# Patient Record
Sex: FEMALE | Race: WHITE | URBAN - METROPOLITAN AREA
[De-identification: names, ages, dates, MRNs, and addresses within clinical notes are randomized per-mention and may not be internally consistent; named-entity substitution may affect disease eponyms.]

---

## 2020-01-23 ENCOUNTER — TRANSFERRED RECORDS (OUTPATIENT)
Dept: HEALTH INFORMATION MANAGEMENT | Facility: CLINIC | Age: 80
End: 2020-01-23

## 2020-01-23 NOTE — TELEPHONE ENCOUNTER
FUTURE VISIT INFORMATION      FUTURE VISIT INFORMATION:    Date: 1/28/20    Time: 8:30am    Location: [facility]  REFERRAL INFORMATION:    Referring provider:  CATARINA QUINONES MD    Referring providers clinic:  [facility]    Reason for visit/diagnosis  NLDO w/ possible bacterial infection, scope/irrigation/HW    RECORDS REQUESTED FROM:       Clinic name Comments Records Status Imaging Status   [facility] Request for recs sent 1/23- received and sent to scanning EPIC

## 2020-01-28 ENCOUNTER — APPOINTMENT (OUTPATIENT)
Dept: LAB | Facility: CLINIC | Age: 80
End: 2020-01-28
Payer: MEDICARE

## 2020-01-28 ENCOUNTER — PRE VISIT (OUTPATIENT)
Dept: OPHTHALMOLOGY | Facility: CLINIC | Age: 80
End: 2020-01-28

## 2020-01-28 ENCOUNTER — TELEPHONE (OUTPATIENT)
Dept: OPHTHALMOLOGY | Facility: CLINIC | Age: 80
End: 2020-01-28

## 2020-01-28 ENCOUNTER — ANCILLARY PROCEDURE (OUTPATIENT)
Dept: CT IMAGING | Facility: CLINIC | Age: 80
End: 2020-01-28
Attending: OPHTHALMOLOGY
Payer: MEDICARE

## 2020-01-28 ENCOUNTER — OFFICE VISIT (OUTPATIENT)
Dept: OPHTHALMOLOGY | Facility: CLINIC | Age: 80
End: 2020-01-28
Payer: MEDICARE

## 2020-01-28 DIAGNOSIS — H04.322 ACUTE DACRYOCYSTITIS OF LEFT LACRIMAL SAC: ICD-10-CM

## 2020-01-28 DIAGNOSIS — C91.10 CLL (CHRONIC LYMPHOCYTIC LEUKEMIA) (H): ICD-10-CM

## 2020-01-28 DIAGNOSIS — H04.552 ACQUIRED OBSTRUCTION OF LEFT NASOLACRIMAL DUCT: ICD-10-CM

## 2020-01-28 DIAGNOSIS — C91.10 CLL (CHRONIC LYMPHOCYTIC LEUKEMIA) (H): Primary | ICD-10-CM

## 2020-01-28 PROBLEM — E26.09 PRIMARY ALDOSTERONISM (H): Status: ACTIVE | Noted: 2017-08-04

## 2020-01-28 PROBLEM — C44.90 SKIN CANCER: Status: ACTIVE | Noted: 2020-01-28

## 2020-01-28 PROBLEM — I10 HYPERTENSION: Status: ACTIVE | Noted: 2020-01-28

## 2020-01-28 PROBLEM — D63.8 ANEMIA OF CHRONIC DISEASE: Status: ACTIVE | Noted: 2018-11-19

## 2020-01-28 PROBLEM — H35.373 EPIRETINAL MEMBRANE (ERM) OF BOTH EYES: Status: ACTIVE | Noted: 2020-01-28

## 2020-01-28 PROBLEM — M85.80 OSTEOPENIA: Status: ACTIVE | Noted: 2019-04-01

## 2020-01-28 PROBLEM — S82.63XA CLOSED FRACTURE OF LATERAL MALLEOLUS: Status: ACTIVE | Noted: 2019-06-18

## 2020-01-28 LAB
CREAT SERPL-MCNC: 0.95 MG/DL (ref 0.52–1.04)
GFR SERPL CREATININE-BSD FRML MDRD: 57 ML/MIN/{1.73_M2}
RADIOLOGIST FLAGS: ABNORMAL

## 2020-01-28 RX ORDER — SULFAMETHOXAZOLE/TRIMETHOPRIM 800-160 MG
TABLET ORAL
COMMUNITY
Start: 2019-11-19

## 2020-01-28 RX ORDER — IOPAMIDOL 755 MG/ML
75 INJECTION, SOLUTION INTRAVASCULAR ONCE
Status: COMPLETED | OUTPATIENT
Start: 2020-01-28 | End: 2020-01-28

## 2020-01-28 RX ORDER — AMLODIPINE BESYLATE 5 MG/1
TABLET ORAL
COMMUNITY
Start: 2019-07-12

## 2020-01-28 RX ORDER — GINSENG 100 MG
CAPSULE ORAL
COMMUNITY
Start: 2019-01-01

## 2020-01-28 RX ORDER — ACETAMINOPHEN 500 MG
1000 TABLET ORAL
COMMUNITY

## 2020-01-28 RX ORDER — LEVOTHYROXINE SODIUM 50 UG/1
50 TABLET ORAL
COMMUNITY
Start: 2018-11-19

## 2020-01-28 RX ORDER — SIMVASTATIN 20 MG
TABLET ORAL
COMMUNITY
Start: 2019-10-05

## 2020-01-28 RX ORDER — SPIRONOLACTONE 50 MG/1
TABLET, FILM COATED ORAL
COMMUNITY
Start: 2019-10-05

## 2020-01-28 RX ORDER — ALLOPURINOL 300 MG/1
TABLET ORAL
COMMUNITY
Start: 2019-09-26

## 2020-01-28 RX ADMIN — IOPAMIDOL 75 ML: 755 INJECTION, SOLUTION INTRAVASCULAR at 10:40

## 2020-01-28 ASSESSMENT — CONF VISUAL FIELD
OS_NORMAL: 1
METHOD: COUNTING FINGERS
OD_NORMAL: 1

## 2020-01-28 ASSESSMENT — VISUAL ACUITY
OD_SC: 20/60
OS_SC+: +2
METHOD: SNELLEN - LINEAR
OS_SC: 20/30

## 2020-01-28 ASSESSMENT — TONOMETRY
OS_IOP_MMHG: 11
IOP_METHOD: ICARE
OD_IOP_MMHG: 10

## 2020-01-28 ASSESSMENT — SLIT LAMP EXAM - LIDS: COMMENTS: NORMAL

## 2020-01-28 NOTE — LETTER
" 2020         RE:  :  MRN: Susana Bennett  1940  [id number]     Dear Dr. Ortiz,    Thank you for asking me to see your patient, Susana Bennett, for an oculoplastic   consultation.  My assessment and plan are below.  For further details, please see my attached clinic note.      Chief Complaint(s) and History of Present Illness(es)     Nasolacrimal Duct Obstruction Evaluation     Laterality: left eye    Characteristics: sudden onset    Associated symptoms: bump on lid    Frequency: constantly    Duration: months    Course: gradually worsening    Treatments tried: warm compresses    Response to treatment: moderate improvement    Pain scale: 0/10              Comments     Susana Bennett is being seen today at the request of Dr. Herman Ortiz for obstruction of nasolacrimal duct left eye.  Pt states has had   recurrent left tear duct infections, started in 2019 and had it   again 5 days ago.  Amoxicillin medication and warm compresses. It burst on   Saturday night. It bled this time, but in the past has drained pus.     Amanda Figueroa, COT COT 8:19 AM 2020     First significant dacryocystis was 2019, but feels had recurrent medial canthal swelling over the past year.     Has history of CLL in \"remision.\" Diagnosed 5 years ago. Treated with chemo.   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     Being observed for Epiretinal membrane on the right eye.          Assessment & Plan     Susana Bennett is a 79 year old female with the following diagnoses:   1. Acute dacryocystitis of left lacrimal sac    2. CLL (chronic lymphocytic leukemia) (H)    3. Acquired obstruction of left nasolacrimal duct         Recurrent dacryocystitis left eye.  Given history of CLL and palpable mass (which could just be dacryocystitis), recommend imaging with CT. Based on imaging results will decide if just lacrimal sac biopsy then later date " Dacryocystorhinostomy (DCR), or left dacryocytorhinostomy and biopsy of lacrimal sac.           Again, thank you for allowing me to participate in the care of your patient.      Sincerely,    Ruperto Cardenas MD  Department of Ophthalmology and Visual Neurosciences  [facility]    CC: Blu Ramírez MD  [facility]  [address]  [place] [address]  VIA Facsimile: [phone]     Richardson Ortiz MD  [facility]           [address] [address]  VIA Facsimile: [phone]

## 2020-01-28 NOTE — NURSING NOTE
Chief Complaints and History of Present Illnesses   Patient presents with     Nasolacrimal Duct Obstruction Evaluation     Chief Complaint(s) and History of Present Illness(es)     Nasolacrimal Duct Obstruction Evaluation     Laterality: left eye    Characteristics: sudden onset    Associated symptoms: bump on lid    Frequency: constantly    Duration: months    Course: gradually worsening    Treatments tried: warm compresses    Response to treatment: moderate improvement    Pain scale: 0/10              Comments     Susana Bennett is being seen today at the request of Andrew Ortiz for obstruction of nasolacrimal duct left eye.  Pt states has had recurrent left tear duct infections, started in November 2019 and had it again 5 days ago.  Amoxicillin medication and warm compresses. It burst on Saturday night.     LENNY Dugan COT 8:19 AM January 28, 2020

## 2020-01-28 NOTE — PROGRESS NOTES
"     Chief Complaint(s) and History of Present Illness(es)     Nasolacrimal Duct Obstruction Evaluation     Laterality: left eye    Characteristics: sudden onset    Associated symptoms: bump on lid    Frequency: constantly    Duration: months    Course: gradually worsening    Treatments tried: warm compresses    Response to treatment: moderate improvement    Pain scale: 0/10              Comments     Susana Bennett is being seen today at the request of Dr. Herman Ortiz for obstruction of nasolacrimal duct left eye.  Pt states has had   recurrent left tear duct infections, started in November 2019 and had it   again 5 days ago.  Amoxicillin medication and warm compresses. It burst on   Saturday night. It bled this time, but in the past has drained pus.     Amanda Figueroa, COT COT 8:19 AM January 28, 2020     First significant dacryocystis was nov 2019, but feels had recurrent medial canthal swelling over the past year.     Has history of CLL in \"remision.\" Diagnosed 5 years ago. Treated with chemo.   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     Being observed for Epiretinal membrane on the right eye.          Assessment & Plan     Susana Bennett is a 79 year old female with the following diagnoses:   1. Acute dacryocystitis of left lacrimal sac    2. CLL (chronic lymphocytic leukemia) (H)    3. Acquired obstruction of left nasolacrimal duct         Recurrent dacryocystitis left eye.  Given history of CLL and palpable mass (which could just be dacryocystitis), recommend imaging with CT. Based on imaging results will decide if just lacrimal sac biopsy then later date Dacryocystorhinostomy (DCR), or left dacryocytorhinostomy and biopsy of lacrimal sac.     Addendum: Reviewed imaging, rim enhancing lesion in lacrimal sac most consistent with dacryocystitis. Plan: Dacryocystorhinostomy (DCR), biopsy lacrimal sac, silicone stent. Called and discussed with " patient.          Attending Physician Attestation:  Complete documentation of historical and exam elements from today's encounter can be found in the full encounter summary report (not reduplicated in this progress note).  I personally obtained the chief complaint(s) and history of present illness.  I confirmed and edited as necessary the review of systems, past medical/surgical history, family history, social history, and examination findings as documented by others; and I examined the patient myself.  I personally reviewed the relevant tests, images, and reports as documented above.  I formulated and edited as necessary the assessment and plan and discussed the findings and management plan with the patient and family. - Ruperto Cardenas MD    DIAGNOSIS: Epiphora left eye  PROCEDURE: Nasal Endoscopy  SURGEON: Ruperto Cardenas MD  ANESTHESIA: Topical  COMPLICATIONS: None  EBL: Nil  FINDINGS: Normal nasal exam for  lacrimal surgery, no evidence of intranasal mass     Indications: Examine the nasal cavity for lacrimal surgery    The zero degree nasal endoscope was passed under endoscopic vision. The inferior turbinates appeared normal. There was  normal middle turbinate architecture. No polyps or purulence. No septal perforation. No masses or lesions were identified.     Ruperto Cardenas MD  January 28, 2020  8:45 AM  I was present for the entire procedure - Ruperto Cardenas MD

## 2020-01-28 NOTE — TELEPHONE ENCOUNTER
Met with patient to schedule surgery with Dr. Ruperto Cardenas.    Surgery was scheduled on 02/13 at [facility]  Patient will have H&P at [facility]  Post-Op care appointment was scheduled on 02/25  Patient is aware a / is needed day of surgery.   Patient received surgery packet has my direct contact information for any further questions.

## 2020-01-30 ENCOUNTER — TRANSFERRED RECORDS (OUTPATIENT)
Dept: HEALTH INFORMATION MANAGEMENT | Facility: CLINIC | Age: 80
End: 2020-01-30

## 2020-02-12 ENCOUNTER — ANESTHESIA EVENT (OUTPATIENT)
Dept: SURGERY | Facility: AMBULATORY SURGERY CENTER | Age: 80
End: 2020-02-12

## 2020-02-13 ENCOUNTER — ANESTHESIA (OUTPATIENT)
Dept: SURGERY | Facility: AMBULATORY SURGERY CENTER | Age: 80
End: 2020-02-13

## 2020-02-13 ENCOUNTER — HOSPITAL ENCOUNTER (OUTPATIENT)
Facility: AMBULATORY SURGERY CENTER | Age: 80
End: 2020-02-13
Attending: OPHTHALMOLOGY
Payer: MEDICARE

## 2020-02-13 VITALS
HEIGHT: 59 IN | SYSTOLIC BLOOD PRESSURE: 135 MMHG | HEART RATE: 96 BPM | RESPIRATION RATE: 16 BRPM | DIASTOLIC BLOOD PRESSURE: 75 MMHG | BODY MASS INDEX: 29.03 KG/M2 | OXYGEN SATURATION: 95 % | WEIGHT: 144 LBS | TEMPERATURE: 98.9 F

## 2020-02-13 DIAGNOSIS — C91.10 CLL (CHRONIC LYMPHOCYTIC LEUKEMIA) (H): ICD-10-CM

## 2020-02-13 DIAGNOSIS — H04.322 ACUTE DACRYOCYSTITIS OF LEFT LACRIMAL SAC: ICD-10-CM

## 2020-02-13 LAB — GLUCOSE BLDC GLUCOMTR-MCNC: 134 MG/DL (ref 70–99)

## 2020-02-13 PROCEDURE — 88185 FLOWCYTOMETRY/TC ADD-ON: CPT | Performed by: OPHTHALMOLOGY

## 2020-02-13 PROCEDURE — 00000160 ZZHCL STATISTIC H-SPECIAL HANDLING: Performed by: OPHTHALMOLOGY

## 2020-02-13 PROCEDURE — 88184 FLOWCYTOMETRY/ TC 1 MARKER: CPT | Performed by: OPHTHALMOLOGY

## 2020-02-13 PROCEDURE — 40001004 ZZHCL STATISTIC FLOW INT 9-15 ABY TC 88188: Performed by: OPHTHALMOLOGY

## 2020-02-13 PROCEDURE — 88341 IMHCHEM/IMCYTCHM EA ADD ANTB: CPT | Performed by: OPHTHALMOLOGY

## 2020-02-13 PROCEDURE — 88305 TISSUE EXAM BY PATHOLOGIST: CPT | Performed by: OPHTHALMOLOGY

## 2020-02-13 PROCEDURE — 00000159 ZZHCL STATISTIC H-SEND OUTS PREP: Performed by: OPHTHALMOLOGY

## 2020-02-13 PROCEDURE — 88342 IMHCHEM/IMCYTCHM 1ST ANTB: CPT | Mod: XU | Performed by: OPHTHALMOLOGY

## 2020-02-13 DEVICE — EYE STENT LACRIMAL LRG LIS052: Type: IMPLANTABLE DEVICE | Site: EYE | Status: FUNCTIONAL

## 2020-02-13 RX ORDER — ERYTHROMYCIN 5 MG/G
0.5 OINTMENT OPHTHALMIC 3 TIMES DAILY
Qty: 3.5 G | Refills: 0 | Status: SHIPPED | OUTPATIENT
Start: 2020-02-13 | End: 2020-02-25

## 2020-02-13 RX ORDER — OXYCODONE HYDROCHLORIDE 5 MG/1
5 TABLET ORAL EVERY 6 HOURS PRN
Qty: 10 TABLET | Refills: 0 | Status: SHIPPED | OUTPATIENT
Start: 2020-02-13 | End: 2020-02-25

## 2020-02-13 RX ORDER — NALOXONE HYDROCHLORIDE 0.4 MG/ML
.1-.4 INJECTION, SOLUTION INTRAMUSCULAR; INTRAVENOUS; SUBCUTANEOUS
Status: DISCONTINUED | OUTPATIENT
Start: 2020-02-13 | End: 2020-02-14 | Stop reason: HOSPADM

## 2020-02-13 RX ORDER — OXYCODONE HYDROCHLORIDE 5 MG/1
5 TABLET ORAL EVERY 4 HOURS PRN
Status: DISCONTINUED | OUTPATIENT
Start: 2020-02-13 | End: 2020-02-14 | Stop reason: HOSPADM

## 2020-02-13 RX ORDER — ONDANSETRON 4 MG/1
4 TABLET, ORALLY DISINTEGRATING ORAL EVERY 30 MIN PRN
Status: DISCONTINUED | OUTPATIENT
Start: 2020-02-13 | End: 2020-02-14 | Stop reason: HOSPADM

## 2020-02-13 RX ORDER — ONDANSETRON 2 MG/ML
INJECTION INTRAMUSCULAR; INTRAVENOUS PRN
Status: DISCONTINUED | OUTPATIENT
Start: 2020-02-13 | End: 2020-02-13

## 2020-02-13 RX ORDER — LIDOCAINE 40 MG/G
CREAM TOPICAL
Status: DISCONTINUED | OUTPATIENT
Start: 2020-02-13 | End: 2020-02-13 | Stop reason: HOSPADM

## 2020-02-13 RX ORDER — OXYMETAZOLINE HYDROCHLORIDE 0.05 G/100ML
SPRAY NASAL PRN
Status: DISCONTINUED | OUTPATIENT
Start: 2020-02-13 | End: 2020-02-13 | Stop reason: HOSPADM

## 2020-02-13 RX ORDER — FENTANYL CITRATE 50 UG/ML
INJECTION, SOLUTION INTRAMUSCULAR; INTRAVENOUS PRN
Status: DISCONTINUED | OUTPATIENT
Start: 2020-02-13 | End: 2020-02-13

## 2020-02-13 RX ORDER — FENTANYL CITRATE 50 UG/ML
25-50 INJECTION, SOLUTION INTRAMUSCULAR; INTRAVENOUS
Status: DISCONTINUED | OUTPATIENT
Start: 2020-02-13 | End: 2020-02-13 | Stop reason: HOSPADM

## 2020-02-13 RX ORDER — PROPOFOL 10 MG/ML
INJECTION, EMULSION INTRAVENOUS PRN
Status: DISCONTINUED | OUTPATIENT
Start: 2020-02-13 | End: 2020-02-13

## 2020-02-13 RX ORDER — DEXAMETHASONE SODIUM PHOSPHATE 4 MG/ML
INJECTION, SOLUTION INTRA-ARTICULAR; INTRALESIONAL; INTRAMUSCULAR; INTRAVENOUS; SOFT TISSUE PRN
Status: DISCONTINUED | OUTPATIENT
Start: 2020-02-13 | End: 2020-02-13

## 2020-02-13 RX ORDER — ONDANSETRON 2 MG/ML
4 INJECTION INTRAMUSCULAR; INTRAVENOUS EVERY 30 MIN PRN
Status: DISCONTINUED | OUTPATIENT
Start: 2020-02-13 | End: 2020-02-14 | Stop reason: HOSPADM

## 2020-02-13 RX ORDER — ACETAMINOPHEN 325 MG/1
975 TABLET ORAL ONCE
Status: COMPLETED | OUTPATIENT
Start: 2020-02-13 | End: 2020-02-13

## 2020-02-13 RX ORDER — ERYTHROMYCIN 5 MG/G
OINTMENT OPHTHALMIC PRN
Status: DISCONTINUED | OUTPATIENT
Start: 2020-02-13 | End: 2020-02-13 | Stop reason: HOSPADM

## 2020-02-13 RX ORDER — SODIUM CHLORIDE, SODIUM LACTATE, POTASSIUM CHLORIDE, CALCIUM CHLORIDE 600; 310; 30; 20 MG/100ML; MG/100ML; MG/100ML; MG/100ML
INJECTION, SOLUTION INTRAVENOUS CONTINUOUS
Status: DISCONTINUED | OUTPATIENT
Start: 2020-02-13 | End: 2020-02-13 | Stop reason: HOSPADM

## 2020-02-13 RX ORDER — LIDOCAINE HYDROCHLORIDE 20 MG/ML
INJECTION, SOLUTION INFILTRATION; PERINEURAL PRN
Status: DISCONTINUED | OUTPATIENT
Start: 2020-02-13 | End: 2020-02-13

## 2020-02-13 RX ORDER — SODIUM CHLORIDE, SODIUM LACTATE, POTASSIUM CHLORIDE, CALCIUM CHLORIDE 600; 310; 30; 20 MG/100ML; MG/100ML; MG/100ML; MG/100ML
INJECTION, SOLUTION INTRAVENOUS CONTINUOUS PRN
Status: DISCONTINUED | OUTPATIENT
Start: 2020-02-13 | End: 2020-02-13

## 2020-02-13 RX ORDER — SODIUM CHLORIDE, SODIUM LACTATE, POTASSIUM CHLORIDE, CALCIUM CHLORIDE 600; 310; 30; 20 MG/100ML; MG/100ML; MG/100ML; MG/100ML
INJECTION, SOLUTION INTRAVENOUS CONTINUOUS
Status: DISCONTINUED | OUTPATIENT
Start: 2020-02-13 | End: 2020-02-14 | Stop reason: HOSPADM

## 2020-02-13 RX ORDER — PROPOFOL 10 MG/ML
INJECTION, EMULSION INTRAVENOUS CONTINUOUS PRN
Status: DISCONTINUED | OUTPATIENT
Start: 2020-02-13 | End: 2020-02-13

## 2020-02-13 RX ORDER — MEPERIDINE HYDROCHLORIDE 25 MG/ML
12.5 INJECTION INTRAMUSCULAR; INTRAVENOUS; SUBCUTANEOUS
Status: DISCONTINUED | OUTPATIENT
Start: 2020-02-13 | End: 2020-02-14 | Stop reason: HOSPADM

## 2020-02-13 RX ADMIN — PROPOFOL 150 MG: 10 INJECTION, EMULSION INTRAVENOUS at 11:28

## 2020-02-13 RX ADMIN — PROPOFOL 150 MCG/KG/MIN: 10 INJECTION, EMULSION INTRAVENOUS at 11:28

## 2020-02-13 RX ADMIN — SODIUM CHLORIDE, SODIUM LACTATE, POTASSIUM CHLORIDE, CALCIUM CHLORIDE: 600; 310; 30; 20 INJECTION, SOLUTION INTRAVENOUS at 11:16

## 2020-02-13 RX ADMIN — Medication 200 MCG: at 11:48

## 2020-02-13 RX ADMIN — LIDOCAINE HYDROCHLORIDE 50 MG: 20 INJECTION, SOLUTION INFILTRATION; PERINEURAL at 11:27

## 2020-02-13 RX ADMIN — OXYCODONE HYDROCHLORIDE 5 MG: 5 TABLET ORAL at 12:42

## 2020-02-13 RX ADMIN — Medication 200 MCG: at 11:30

## 2020-02-13 RX ADMIN — DEXAMETHASONE SODIUM PHOSPHATE 4 MG: 4 INJECTION, SOLUTION INTRA-ARTICULAR; INTRALESIONAL; INTRAMUSCULAR; INTRAVENOUS; SOFT TISSUE at 11:33

## 2020-02-13 RX ADMIN — ACETAMINOPHEN 975 MG: 325 TABLET ORAL at 09:04

## 2020-02-13 RX ADMIN — ONDANSETRON 4 MG: 2 INJECTION INTRAMUSCULAR; INTRAVENOUS at 11:33

## 2020-02-13 RX ADMIN — Medication 200 MCG: at 11:39

## 2020-02-13 RX ADMIN — FENTANYL CITRATE 25 MCG: 50 INJECTION, SOLUTION INTRAMUSCULAR; INTRAVENOUS at 11:27

## 2020-02-13 ASSESSMENT — MIFFLIN-ST. JEOR: SCORE: 1043.81

## 2020-02-13 NOTE — OP NOTE
PREOPERATIVE DIAGNOSIS: Left complete nasolacrimal duct obstruction with dacryocystitis.   POSTOPERATIVE DIAGNOSIS: Left complete nasolacrimal duct obstruction with dacryocystitis.   PROCEDURE: Left dacryocystorhinostomy with silicone intubation. Left lacrimal sac biopsy.    SURGEON: Ruperot Cardenas M.D.  ASSISTANTS: Tarik Nam MD  ANESTHESIA: General anesthesia with local infiltration of a 50/50 mixture of 2% lidocaine with epinephrine and 0.5% Marcaine.   COMPLICATIONS: None.   ESTIMATED BLOOD LOSS: Less than 5 mL.   SPECIMEN: * No specimens in log *  HISTORY: Susana Bennett  presented with complete nasolacrimal duct obstruction leading to tearing and chronic irritation. She had a palpable medial canthal mass. On imaging this appeared to be rim enhancing most likely consistent with dacryocystitis. Given her history of CLL, decision was made to biopsy the lacrimal sac to be certain it does not indicate involvement of the lacrimal sac. After the risks, benefits and alternatives to the proposed procedure were explained, informed consent was obtained.   DESCRIPTION OF PROCEDURE: Susana Bennett  was brought to the operating room and placed supine on the operating table. Under general anesthesia, a medial canthal incision was drawn 10 mm from the medial canthal angle and extending 8 mm in an inferotemporal direction.  The medial canthus and lateral nasal wall were infiltrated with local anesthetic mixture. Afrin soaked neuropaddies were placed into the nose in the region of the middle meatus.   The area  was prepped and draped in the typical fashion. Attention was directed to the left side. Medial canthal incision was made with a 15 blade and dissection was carried down through the orbicularis with monopolar cautery. [place] elevator was used to elevate the periorbita from the lacrimal fossa.  The neuropaddies were removed from the nose.  The thin posterior lacrimal bone was infractured with the [place] elevator. The  osteotomy was created using [place] and [place] rongeurs. Approximately a 12 mm osteotomy was created. The 0 Mg probe was placed in the lacrimal sac through the upper canaliculus. The lacrimal sac was opened vertically with the keratome. A posterior flap of lacrimal sac was removed and sent to pathology. The nasal mucosa was incised with a keratome in a U-shaped anteriorly based flap. The stent tube silicone intubation set was used to intubate the upper and lower canalicular system and retrieved in the nose with a Booth hook. A 5-0 vicryl was used to tie the stent together to prevent prolapse. The lacrimal sac and nasal mucosa were sutured together with interrupted 5-0 Vicryl sutures. Orbicularis layer was closed with running 5-0 Vicryl suture. Skin was closed with running 6-0 plain gut suture. The silicone tubes were tied in a square knot and were trimmed at the naris.  Erythromycin ophthalmic ointment was applied to the incision. The patient tolerated the procedure well and  left the operating room in stable condition.   Ruperto Cardenas MD

## 2020-02-13 NOTE — ANESTHESIA CARE TRANSFER NOTE
Patient: Susana Bennett    Procedure(s):  Left lacrimal sac biopsy and external dacryocystorhinostomy    Diagnosis: Acute dacryocystitis of left lacrimal sac [H04.322]  CLL (chronic lymphocytic leukemia) (H) [C91.10]  Diagnosis Additional Information: No value filed.    Anesthesia Type:   General     Note:  Airway :Room Air  Patient transferred to:PACU  Comments: VSS and WNL, comfortable, no PONV, report to Joseline VILLEDAHandoff Report: Identifed the Patient, Identified the Reponsible Provider, Reviewed the pertinent medical history, Discussed the surgical course, Reviewed Intra-OP anesthesia mangement and issues during anesthesia, Set expectations for post-procedure period and Allowed opportunity for questions and acknowledgement of understanding      Vitals: (Last set prior to Anesthesia Care Transfer)    [facility] VITALS  2/13/2020 1139 - 2/13/2020 1213      2/13/2020             Pulse:  77    SpO2:  96 %    Resp Rate (observed):  14                Electronically Signed By: MESERET Estrada CRNA  February 13, 2020  12:13 PM

## 2020-02-13 NOTE — ANESTHESIA POSTPROCEDURE EVALUATION
Anesthesia POST Procedure Evaluation    Patient: Susana Bennett   MRN:     [id number] Gender:   female   Age:    79 year old :      1940        Preoperative Diagnosis: Acute dacryocystitis of left lacrimal sac [H04.322]  CLL (chronic lymphocytic leukemia) (H) [C91.10]   Procedure(s):  Left lacrimal sac biopsy and external dacryocystorhinostomy   Postop Comments: No value filed.       Anesthesia Type:  Not documented  General    Reportable Event: NO     PAIN: Uncomplicated   Sign Out status: Comfortable, Well controlled pain     PONV: No PONV   Sign Out status:  No Nausea or Vomiting     Neuro/Psych: Uneventful perioperative course   Sign Out Status: Preoperative baseline; Age appropriate mentation     Airway/Resp.: Uneventful perioperative course   Sign Out Status: Non labored breathing, age appropriate RR; Resp. Status within EXPECTED Parameters     CV: Uneventful perioperative course   Sign Out status: Appropriate BP and perfusion indices; Appropriate HR/Rhythm     Disposition:   Sign Out in:  PACU  Disposition:  Phase II; Home  Recovery Course: Uneventful  Follow-Up: Not required           Last Anesthesia Record Vitals:  [facility] VITALS  2020 1139 - 2020 1239      2020             Pulse:  77    SpO2:  96 %    Resp Rate (observed):  14          Last PACU Vitals:  Vitals Value Taken Time   /94 2020 12:24 PM   Temp 37.2  C (98.9  F) 2020 12:24 PM   Pulse 94 2020 12:24 PM   Resp 12 2020 12:25 PM   SpO2 93 % 2020 12:25 PM   Temp src     NIBP     Pulse     SpO2     Resp     Temp     Ht Rate     Temp 2     Vitals shown include unvalidated device data.      Electronically Signed By: Scotty Sandoval MD, 2020, 2:14 PM

## 2020-02-13 NOTE — DISCHARGE INSTRUCTIONS
Post-operative Instructions    Ophthalmic Plastic and Reconstructive Surgery  MD Nancy Brower M.D.    All instructions apply to the operated eye(s) or eyelid(s).  Wound care and personal care  ? If a patch or bandage has been placed, please leave this in place until seen by your physician. Ensure that the bandage does not get wet when you take a shower.  ? Apply ice compresses 15 minutes on 15 minutes off while awake for 2 days, then switch to warm water compresses 4 times a day until seen by your physician. For warm packs you can place a cup of dry uncooked rice in a clean cotton sock. Then place sock in microwave 30 seconds to one minute. Next place the warm sock into a plastic bag and wrap the bag with clean warm wet washcloth and place over operated eye.    ? You may shower or wash your hair the day after surgery. Do not bathe or go swimming for 1 week to prevent contamination of your wounds.  ? Do not apply make-up to the eyes or eyelids for 2 weeks after surgery.  ? Expect some swelling, bruising, black eye (even into the lower eyelids and cheeks). Also expect serum caking, crusting and discharge from the eye and/or incisions. A small amount of surface bleeding is normal for the first 48 hours.  ? Your eye(s) and eyelid(s) may be painful and tender. This is normal after surgery.  Use the pain medication as prescribed. If your pain does not improve despite the  medication, contact the office.    Contact information and follow-up  ? Return to the Eye Clinic for a follow-up appointment with your physician as  scheduled. If no appointment has been scheduled, call [phone] for an  appointment with Dr. Cardenas within 1 to 2 weeks from your date of surgery.  ? For severe pain, bleeding, or loss of vision, call the Eye Clinic at [phone].  After hours or on weekends and holidays, call [phone] and ask to speak with  the ophthalmologist on call.    Activity restrictions and  driving  ? Avoid heavy lifting, bending, exercise or strenuous activity for 1 week after surgery.  You may resume other activities and return to work as tolerated.  ? You may not resume driving until have you stopped using narcotic pain medications(such as Norco, Percocet, Tylenol #3).    Medications  ? Restart all your regular home medications and eye drops. If you take Plavix or  Aspirin on a regular basis, wait for 3 days after your surgery before restarting these  in order to decrease the risk of bleeding complications.  ? Avoid aspirin and aspirin-like medications (Motrin, Aleve, Ibuprofen, Harika-  [place] etc) for 5 days to reduce the risk of bleeding. You may take Tylenol  (acetaminophen) for pain.  ? In addition to your home medications, take the following post-operative medications as prescribed by your physician.    ? Apply antibiotic ointment to all sutures three times a day, and into the operated eye(s) at night.  ? Take antibiotic capsules or tablets as directed.  ? Take 1 to 2 pain pills as needed for pain up to every 6 hours    ? WARNING: All the prescription pain medications contain Tylenol  (acetaminophen). You must not take more than 4,000 mg of acetaminophen per  24-hour period. This is equivalent to 6 tablets of Darvocet, 8 tablets of Vicodin, or  12 tablets of Norco, Percocet or Tylenol #3. If you take other over-the-counter medications  containing acetaminophen, you must take the amount of acetaminophen into  account and reduce the number of prescribed pain pills accordingly.  ? The prescribed medications may make you drowsy. You must not drive a car,  operate heavy machinery or drink alcohol while taking them.  ? The prescribed pain medications may cause constipation and nausea. Take them  with some food to prevent a stomach upset. If you continue to experience nausea,  call your physician.    [facility]  Home Care Following Anesthesia  For 24 hours after  surgery:  1. Get plenty of rest.  A responsible adult must stay with you for at least 24 hours after you leave the surgery center.  2. Do not drive or use heavy equipment.  If you have weakness or tingling, don't drive or use heavy equipment until this feeling goes away.   3. Do not drink alcohol.   4. Avoid strenuous or risky activities.  Ask for help when climbing stairs.  5. You may feel lightheaded.  IF so, sit for a few minutes before standing.  Have someone help you get up.   6. If you have nausea (feel sick to your stomach): Drink only clear liquids such as apple juice, ginger ale, broth or 7-Up.  Rest may also help.  Be sure to drink enough fluids.  Move to a regular diet as you feel able.   7. You may have a slight fever.  Call the doctor if your fever is over 100 F (37.7 C) (taken under the tongue) or lasts longer than 24 hours.  8. You may have a dry mouth, a sore throat, muscle aches or trouble sleeping. These should go away after 24 hours.  9. Do not make important or legal decisions.               Tips for taking pain medications  To get the best pain relief possible, remember these points:    Take pain medications as directed, before pain becomes severe.    Pain medication can upset your stomach: taking it with food may help.    Constipation is a common side effect of pain medication. Drink plenty of  fluids.    Eat foods high in fiber. Take a stool softener if recommended by your doctor or pharmacist.    Do not drink alcohol, drive or operate machinery while taking pain medications.    Ask about other ways to control pain, such as with heat, ice or relaxation.    Tylenol/Acetaminophen Consumption  To help encourage the safe use of acetaminophen, the makers of TYLENOL  have lowered the maximum daily dose for single-ingredient Extra Strength TYLENOL  (acetaminophen) products sold in the [place] from 8 pills per day (4,000 mg) to 6 pills per day (3,000 mg). The dosing interval has also changed from 2 pills  every 4-6 hours to 2 pills every 6 hours.    If you feel your pain relief is insufficient, you may take Tylenol/Acetaminophen in addition to your narcotic pain medication.     Be careful not to exceed 3,000 mg of Tylenol/Acetaminophen in a 24 hour period from all sources.    If you are taking extra strength Tylenol/acetaminophen (500 mg), the maximum dose is 6 tablets in 24 hours.    If you are taking regular strength acetaminophen (325 mg), the maximum dose is 9 tablets in 24 hours.    Call a doctor for any of the followin. Signs of infection (fever, growing tenderness at the surgery site, a large amount of drainage or bleeding, severe pain, foul-smelling drainage, redness, swelling).  2. It has been over 8 to 10 hours since surgery and you are still not able to urinate (pass water).  3. Headache for over 24 hours.  4. Numbness, tingling or weakness the day after surgery (if you had spinal anesthesia).  Your doctor is:       Dr. Ruperto Cardenas, Ophthalmology: [phone]               Or dial [phone] and ask for the resident on call for:  Ophthalmology  For emergency care, call the:  [facility]:  [phone] (TTY for hearing impaired: [phone])

## 2020-02-13 NOTE — ANESTHESIA PREPROCEDURE EVALUATION
"Anesthesia Pre-Procedure Evaluation    Patient: Susana Bennett   MRN:     [id number] Gender:   female   Age:    79 year old :      1940        Preoperative Diagnosis: Acute dacryocystitis of left lacrimal sac [H04.322]  CLL (chronic lymphocytic leukemia) (H) [C91.10]   Procedure(s):  Left lacrimal sac biopsy and possible external dacryocystorhinostomy     LABS:  CBC: No results found for: WBC, HGB, HCT, PLT  BMP:   Lab Results   Component Value Date    CR 0.95 2020     COAGS: No results found for: PTT, INR, FIBR  POC:   Lab Results   Component Value Date     (H) 2020     OTHER: No results found for: PH, LACT, A1C, EBONY, PHOS, MAG, ALBUMIN, PROTTOTAL, ALT, AST, GGT, ALKPHOS, BILITOTAL, BILIDIRECT, LIPASE, AMYLASE, VASILE, TSH, T4, T3, CRP, SED     Preop Vitals    BP Readings from Last 3 Encounters:   20 (!) 150/93    Pulse Readings from Last 3 Encounters:   20 100      Resp Readings from Last 3 Encounters:   20 16    SpO2 Readings from Last 3 Encounters:   20 96%      Temp Readings from Last 1 Encounters:   20 36.9  C (98.5  F) (Oral)    Ht Readings from Last 1 Encounters:   20 1.499 m (4' 11\")      Wt Readings from Last 1 Encounters:   20 65.3 kg (144 lb)    Estimated body mass index is 29.08 kg/m  as calculated from the following:    Height as of this encounter: 1.499 m (4' 11\").    Weight as of this encounter: 65.3 kg (144 lb).     LDA:  Peripheral IV 20 Right Hand (Active)   Site Assessment WDL 2020  9:23 AM   Line Status Infusing 2020  9:23 AM   Phlebitis Scale 0-->no symptoms 2020  9:23 AM   Infiltration Scale 0 2020  9:23 AM   Extravasation? No 2020  9:23 AM   Dressing Intervention New dressing  2020  9:23 AM   Number of days: 0       Airway - Adult/Peds laryngeal mask airway (Active)   Number of days: 0        History reviewed. No pertinent past medical history.   Past Surgical History:   Procedure Laterality " Date     CATARACT IOL, RT/LT        Allergies   Allergen Reactions     Cranberry Extract Anaphylaxis     Lisinopril Difficulty breathing, Other (See Comments), Shortness Of Breath and Swelling     12- 10:13:43 - verifiedverified  12- 10:13:43 - verifiedverified       Raspberry Anaphylaxis     Pineapple Difficulty breathing, Other (See Comments) and Swelling     verified               JZG FV AN PHYSICAL EXAM    Assessment:   ASA SCORE: 2    H&P: History and physical reviewed and following examination; no interval change.         Plan:   Anes. Type:  General   Pre-Medication: None   Induction:  IV (Standard)   Airway: LMA   Access/Monitoring: PIV   Maintenance: Balanced     Postop Plan:   Postop Pain: Opioids  Postop Sedation/Airway: Not planned  Disposition: Outpatient     PONV Management:   Adult Risk Factors: Female, Postop Opioids   Prevention: Ondansetron, Dexamethasone     CONSENT: Direct conversation   Plan and risks discussed with: Patient                      Scotty Sandoval MD     ANESTHESIA PREOP EVALUATION    PROCEDURE: Procedure(s):  Left lacrimal sac biopsy and possible external dacryocystorhinostomy    HPI: Susana Bennett is a 79 year old female who presents for above procedure.    PAST MEDICAL HISTORY:    History reviewed. No pertinent past medical history.    PAST SURGICAL HISTORY:    Past Surgical History:   Procedure Laterality Date     CATARACT IOL, RT/LT         SOCIAL HISTORY:       Social History     Tobacco Use     Smoking status: Never Smoker     Smokeless tobacco: Never Used   Substance Use Topics     Alcohol use: Yes       ALLERGIES:     Allergies   Allergen Reactions     Cranberry Extract Anaphylaxis     Lisinopril Difficulty breathing, Other (See Comments), Shortness Of Breath and Swelling     12- 10:13:43 - verifiedverified  12- 10:13:43 - verifiedverified       Raspberry Anaphylaxis     Pineapple Difficulty breathing, Other (See Comments) and Swelling      verified         MEDICATIONS:     (Not in a hospital admission)      Current Outpatient Medications   Medication Sig Dispense Refill     acetaminophen (TYLENOL) 500 MG tablet Take 1,000 mg by mouth       allopurinol (ZYLOPRIM) 300 MG tablet        amLODIPine (NORVASC) 5 MG tablet        amoxicillin-clavulanate (AUGMENTIN) 875-125 MG tablet Take 1 tablet by mouth 2 times daily for 10 days 20 tablet 0     Calcium Carb-Cholecalciferol (CALTRATE 600+D3 PO)        cholecalciferol (D3-1000) 25 MCG (1000 UT) TABS        erythromycin (ROMYCIN) 5 MG/GM ophthalmic ointment Apply 0.5 inches to eye 3 times daily Apply small amount to incision sites, as directed per physician instructions. 3.5 g 0     levothyroxine (SYNTHROID/LEVOTHROID) 50 MCG tablet Take 50 mcg by mouth       oxyCODONE (ROXICODONE) 5 MG tablet Take 1 tablet (5 mg) by mouth every 6 hours as needed for pain 10 tablet 0     simvastatin (ZOCOR) 20 MG tablet        spironolactone (ALDACTONE) 50 MG tablet        sulfamethoxazole-trimethoprim (BACTRIM DS/SEPTRA DS) 800-160 MG tablet        zinc 50 MG TABS          Current Outpatient Medications Ordered in Epic   Medication Sig Dispense Refill     acetaminophen (TYLENOL) 500 MG tablet Take 1,000 mg by mouth       allopurinol (ZYLOPRIM) 300 MG tablet        amLODIPine (NORVASC) 5 MG tablet        amoxicillin-clavulanate (AUGMENTIN) 875-125 MG tablet Take 1 tablet by mouth 2 times daily for 10 days 20 tablet 0     Calcium Carb-Cholecalciferol (CALTRATE 600+D3 PO)        cholecalciferol (D3-1000) 25 MCG (1000 UT) TABS        erythromycin (ROMYCIN) 5 MG/GM ophthalmic ointment Apply 0.5 inches to eye 3 times daily Apply small amount to incision sites, as directed per physician instructions. 3.5 g 0     levothyroxine (SYNTHROID/LEVOTHROID) 50 MCG tablet Take 50 mcg by mouth       oxyCODONE (ROXICODONE) 5 MG tablet Take 1 tablet (5 mg) by mouth every 6 hours as needed for pain 10 tablet 0     simvastatin (ZOCOR) 20 MG tablet         spironolactone (ALDACTONE) 50 MG tablet        sulfamethoxazole-trimethoprim (BACTRIM DS/SEPTRA DS) 800-160 MG tablet        zinc 50 MG TABS        Current Facility-Administered Medications Ordered in Epic   Medication Dose Route Frequency Provider Last Rate Last Dose     lactated ringers infusion   Intravenous Continuous Scotty Sandoval MD         lidocaine (LMX4) kit   Topical Q1H PRN Scotty Sandoval MD         lidocaine 1 % 0.1-1 mL  0.1-1 mL Other Q1H PRN Scotty Sandoval MD         sodium chloride (PF) 0.9% PF flush 3 mL  3 mL Intracatheter q1 min prn Scotty Sandoval MD         sodium chloride (PF) 0.9% PF flush 3 mL  3 mL Intracatheter Q8H Scotty Sandoval MD           PHYSICAL EXAM:    Vitals: T 98.5, P 100, /93, R 16, SpO2 96%, Weight   Wt Readings from Last 2 Encounters:   02/13/20 65.3 kg (144 lb)       See doc flowsheet    NPO STATUS: see doc flowsheet    LABS:    BMP:  Recent Labs   Lab Test 01/28/20  0944   CR 0.95       LFTs:   No results for input(s): PROTTOTAL, ALBUMIN, BILITOTAL, ALKPHOS, AST, ALT, BILIDIRECT in the last 14311 hours.    CBC:   No results for input(s): WBC, RBC, HGB, HCT, MCV, MCH, MCHC, RDW, PLT in the last 05863 hours.    Coags:  No results for input(s): INR, PTT, FIBR in the last 20681 hours.    Imaging:  No orders to display       Scotty Sandoval MD  Anesthesiology Staff  Pager [phone]    2/13/2020  10:10 AM

## 2020-02-14 LAB — COPATH REPORT: NORMAL

## 2020-02-18 LAB — COPATH REPORT: NORMAL

## 2020-02-25 ENCOUNTER — OFFICE VISIT (OUTPATIENT)
Dept: OPHTHALMOLOGY | Facility: CLINIC | Age: 80
End: 2020-02-25
Payer: MEDICARE

## 2020-02-25 DIAGNOSIS — H04.322 ACUTE DACRYOCYSTITIS OF LEFT LACRIMAL SAC: Primary | ICD-10-CM

## 2020-02-25 DIAGNOSIS — C91.10 CLL (CHRONIC LYMPHOCYTIC LEUKEMIA) (H): ICD-10-CM

## 2020-02-25 ASSESSMENT — TONOMETRY
OD_IOP_MMHG: 9
IOP_METHOD: ICARE
OS_IOP_MMHG: 10

## 2020-02-25 ASSESSMENT — VISUAL ACUITY
OD_SC: 20/60
METHOD: SNELLEN - LINEAR
OS_SC+: -1
OS_SC: 20/25
OD_SC+: -1

## 2020-02-25 ASSESSMENT — SLIT LAMP EXAM - LIDS: COMMENTS: NORMAL

## 2020-02-25 NOTE — PROGRESS NOTES
Chief Complaint(s) and History of Present Illness(es)     Post Op (Ophthalmology) Left Eye     Laterality: left eye    Frequency: constantly    Course: stable    Associated symptoms: tearing    Pain scale: 0/10              Comments     S/p Left dacryocystorhinostomy with silicone intubation. Left lacrimal   sac biopsy on 2/13/2020. Pt not having any pain. Healing well. Still   tearing       Amanda Figueroa, COT COT 1:39 PM February 25, 2020            Having occasional L sided headaches, relieved with Tylenol. Still having tearing. Noticing some blood comping out of R nostril, no pus.     Lacrimal sac biopsy L with Kappa monotypic B cell population. Following Dr. Shawn Shea at [facility] for CLL s/p chemo 2016    Patient Name: RICO RYAN   MR#: [id number]   Specimen #: [id number]   Collected: 2/13/2020   Received: 2/13/2020   Reported: 2/18/2020 16:34   Ordering Phy(s): CARLOS ALBERTO ESPARZA     For improved result formatting, select 'View Enhanced Report Format' under    Linked Documents section.     SPECIMEN(S):   Left lacrimal sac     FINAL DIAGNOSIS:   Lacrimal sac, left, biopsy:   - Atypical lymphocytic infiltrate (see comment)     COMMENT:   There is no definitive evidence for lymphoma. By separate report   ([id number]), flow cytometric immunophenotyping   performed on left lacrimal sac biopsy shows 17% kappa monotypic B-cells,   which lack CD5, CD10, and CD38. By   report, the patient's CLL/SLL was positive for CD5 and expressed kappa.     The tissue section has focal lymphocytic infiltrate that is a mixture and   B and T cells and does not efface   the tissue. This process could represent chronic inflammation; although   involvement by the patient's   previously diagnosed CLL/SLL cannot be excluded.     I have personally reviewed all specimens and/or slides, including the   listed special stains, and used them   with my medical judgement to determine or confirm the final diagnosis.     Electronically signed  "out by:     Jesika Miller M.D. Manuel     CLINICAL HISTORY:   As per [facility] medical records, 79 year-old female diagnosed with CLL/SLL in   2014 and recurrent lacrimal   abscesses.     GROSS:   The specimen is received fresh with proper patient identification labeled   \"left lacrimal sac\".  The specimen   consists of a tan-red irregular piece of soft tissue (0.8 x 0.3 x 0.1 cm).     Touch preps are made.   Representative section is submitted in RPMI for flow cytometry.  The   remainder of the specimen is wrapped and   entirely submitted in cassette A1. (Dictated by: Garo VINSON   2/13/2020 12:54 PM)     MICROSCOPIC:   The microscopic examination shows lymphocytic and plasma cell infiltrate   in perivascular and periductal   aggregates within a background of fibrotic soft tissue with an epithelial   lining.     The block is stained by immunohistochemistry for CD3, CD5, CD20, CD23,   , PAX-5, IgG, IgG 4, and kappa and   lambda light chains with appropriately reactive control tissues. T-cells   interspersed with the aggregates are   positive for CD3 and CD5. B-cells are positive for CD20 and PAX-5 but are   negative for CD5. CD23 highlights a   few cells that could represent follicular dendritic cells or B cells   within one aggregate.  highlights   plasma cells. Kappa and lambda light chains shows polytypic plasma cells.   IgG and IgG4 shows no increase in   IgG4 plasma cells.     Bar Mcintosh MD     Assessment & Plan     Susana NICOLE Donald is a 79 year old female with the following diagnoses:   1. Acute dacryocystitis of left lacrimal sac    2. CLL (chronic lymphocytic leukemia) (H)    - S/p Left dacryocystorhinostomy with silicone intubation. Left lacrimal   sac biopsy on 2/13/2020  - Lacrimal sac biopsy L with Kappa monotypic B cell population  - Sutures removed at slit lamp  - Healing well    Will forward path to Dr. Shea (her oncologist), and patient has an appointment there this spring, " they will see if they can see Dr. Shea a bit sooner. Also given paper copy.  F/u 6-8 weeks for stent removal.       Tarik Nam MD  Ophthalmology PGY-2  [facility]      Attending Physician Attestation: Complete documentation of historical and exam elements from today's encounter can be found in the full encounter summary report (not reduplicated in this progress note). I personally obtained the chief complaint(s) and history of present illness. I confirmed and edited as necessary the review of systems, past medical/surgical history, family history, social history, and examination findings as documented by others; and I examined the patient myself. I personally reviewed the relevant tests, images, and reports as documented above. I formulated and edited as necessary the assessment and plan and discussed the findings and management plan with the patient and family. I personally reviewed the ophthalmic test(s) associated with this encounter, agree with the interpretation(s) as documented by the resident/fellow, and have edited the corresponding report(s) as necessary. Ruperto Cardenas MD

## 2020-02-25 NOTE — NURSING NOTE
Chief Complaints and History of Present Illnesses   Patient presents with     Post Op (Ophthalmology) Left Eye     Chief Complaint(s) and History of Present Illness(es)     Post Op (Ophthalmology) Left Eye     Laterality: left eye    Frequency: constantly    Course: stable    Associated symptoms: tearing    Pain scale: 0/10              Comments     S/p Left dacryocystorhinostomy with silicone intubation. Left lacrimal sac biopsy on 2/13/2020. Pt not having any pain. Healing well. Still tearing       Amanda Figueroa, COT COT 1:39 PM February 25, 2020

## 2020-02-25 NOTE — LETTER
2020         RE:  :  MRN: Rico Ryan  1940  [id number]     Dear Dr. Ortiz and Dr. Shea,    Thank you for asking me to see your patient, Rico Ryan, for an oculoplastic   consultation.  My assessment and plan are below.  For further details, please see my attached clinic note.      Chief Complaint(s) and History of Present Illness(es)     Post Op (Ophthalmology) Left Eye     Laterality: left eye    Frequency: constantly    Course: stable    Associated symptoms: tearing    Pain scale: 0/10              Comments     S/p Left dacryocystorhinostomy with silicone intubation. Left lacrimal   sac biopsy on 2020. Pt not having any pain. Healing well. Still   tearing       Padao Figueroa, COT COT 1:39 PM 2020            Having occasional L sided headaches, relieved with Tylenol. Still having tearing. Noticing some blood comping out of R nostril, no pus.     Lacrimal sac biopsy L with Kappa monotypic B cell population. Following Dr. Shawn Shea at [facility] for CLL s/p chemo     Patient Name: RICO RYAN   MR#: [id number]   Specimen #: [id number]   Collected: 2020   Received: 2020   Reported: 2020 16:34   Ordering Phy(s): CARLOS ALBERTO ESPARZA     For improved result formatting, select 'View Enhanced Report Format' under    Linked Documents section.     SPECIMEN(S):   Left lacrimal sac     FINAL DIAGNOSIS:   Lacrimal sac, left, biopsy:   - Atypical lymphocytic infiltrate (see comment)     COMMENT:   There is no definitive evidence for lymphoma. By separate report   ([id number]), flow cytometric immunophenotyping   performed on left lacrimal sac biopsy shows 17% kappa monotypic B-cells,   which lack CD5, CD10, and CD38. By   report, the patient's CLL/SLL was positive for CD5 and expressed kappa.     The tissue section has focal lymphocytic infiltrate that is a mixture and   B and T cells and does not efface   the tissue. This process could represent chronic inflammation;  "although   involvement by the patient's   previously diagnosed CLL/SLL cannot be excluded.     I have personally reviewed all specimens and/or slides, including the   listed special stains, and used them   with my medical judgement to determine or confirm the final diagnosis.     Electronically signed out by:     MARNI Lemon     CLINICAL HISTORY:   As per [facility] medical records, 79 year-old female diagnosed with CLL/SLL in   2014 and recurrent lacrimal   abscesses.     GROSS:   The specimen is received fresh with proper patient identification labeled   \"left lacrimal sac\".  The specimen   consists of a tan-red irregular piece of soft tissue (0.8 x 0.3 x 0.1 cm).     Touch preps are made.   Representative section is submitted in RPMI for flow cytometry.  The   remainder of the specimen is wrapped and   entirely submitted in cassette A1. (Dictated by: Garo VINSON   2/13/2020 12:54 PM)     MICROSCOPIC:   The microscopic examination shows lymphocytic and plasma cell infiltrate   in perivascular and periductal   aggregates within a background of fibrotic soft tissue with an epithelial   lining.     The block is stained by immunohistochemistry for CD3, CD5, CD20, CD23,   , PAX-5, IgG, IgG 4, and kappa and   lambda light chains with appropriately reactive control tissues. T-cells   interspersed with the aggregates are   positive for CD3 and CD5. B-cells are positive for CD20 and PAX-5 but are   negative for CD5. CD23 highlights a   few cells that could represent follicular dendritic cells or B cells   within one aggregate.  highlights   plasma cells. Kappa and lambda light chains shows polytypic plasma cells.   IgG and IgG4 shows no increase in   IgG4 plasma cells.     Bar Mcintosh MD     Assessment & Plan     Susana NICOLE Donald is a 79 year old female with the following diagnoses:   1. Acute dacryocystitis of left lacrimal sac    2. CLL (chronic lymphocytic leukemia) (H)    - S/p Left " dacryocystorhinostomy with silicone intubation. Left lacrimal   sac biopsy on 2/13/2020  - Lacrimal sac biopsy L with Kappa monotypic B cell population  - Sutures removed at slit lamp  - Healing well    Will forward path to Dr. Shea (her oncologist), and patient has an appointment there this spring, they will see if they can see Dr. Shea a bit sooner. Also given paper copy.  F/u 6-8 weeks for stent removal.      Again, thank you for allowing me to participate in the care of your patient.      Sincerely,    Ruperto Cardenas MD  Department of Ophthalmology and Visual Neurosciences  [facility]    CC: Richardson Ortiz MD  [facility]           [address] [address]  VIA Facsimile: [phone]     Shawn Shea MD  [address] [address]  VIA Outside Provider Messaging

## 2020-03-11 ENCOUNTER — HEALTH MAINTENANCE LETTER (OUTPATIENT)
Age: 80
End: 2020-03-11

## 2020-05-26 ENCOUNTER — OFFICE VISIT (OUTPATIENT)
Dept: OPHTHALMOLOGY | Facility: CLINIC | Age: 80
End: 2020-05-26
Payer: MEDICARE

## 2020-05-26 DIAGNOSIS — C91.10 CLL (CHRONIC LYMPHOCYTIC LEUKEMIA) (H): ICD-10-CM

## 2020-05-26 DIAGNOSIS — H04.322 ACUTE DACRYOCYSTITIS OF LEFT LACRIMAL SAC: Primary | ICD-10-CM

## 2020-05-26 DIAGNOSIS — H04.552 ACQUIRED OBSTRUCTION OF LEFT NASOLACRIMAL DUCT: ICD-10-CM

## 2020-05-26 RX ORDER — ASPIRIN 81 MG/1
81 TABLET ORAL
COMMUNITY

## 2020-05-26 ASSESSMENT — SLIT LAMP EXAM - LIDS: COMMENTS: NORMAL

## 2020-05-26 ASSESSMENT — VISUAL ACUITY
METHOD: SNELLEN - LINEAR
OD_SC+: +2
OS_SC: 20/25
OD_SC: 20/60

## 2020-05-26 ASSESSMENT — TONOMETRY
OD_IOP_MMHG: 13
IOP_METHOD: ICARE
OS_IOP_MMHG: 14

## 2020-05-26 ASSESSMENT — CONF VISUAL FIELD
OD_NORMAL: 1
OS_NORMAL: 1

## 2020-05-26 NOTE — PROGRESS NOTES
Chief Complaint(s) and History of Present Illness(es)     Post Op (Ophthalmology) Left Eye     Laterality: both eyes    Onset: months ago    Frequency: constantly    Course: stable    Pain scale: 0/10    Comments: Stent removal S/p Left dacryocystorhinostomy with silicone   intubation. Left lacrimal sac biopsy on 2/13/2020.              Comments     No tearing, eye pain or vision changes. No eye drops currently.     Ivy Oh COT 12:22 PM May 26, 2020     Doing well. She disussed path with her oncologist Dr. Shea, and there is no change to therapy.      FINAL DIAGNOSIS:   Lacrimal sac, left, biopsy:   - Atypical lymphocytic infiltrate (see comment)     COMMENT:   There is no definitive evidence for lymphoma.  Assessment & Plan     Susana Bennett is a 79 year old female with the following diagnoses:   1. Acute dacryocystitis of left lacrimal sac    2. CLL (chronic lymphocytic leukemia) (H)    3. Acquired obstruction of left nasolacrimal duct      Healing nicely, no tearing. Resolved dacryocystitis. Stent was removed in clinic and there is a nice ostium on nasal endoscopy.     F/u with Dr. Ortiz. F/u with me as needed.          Sandro Freitas MD  Ophthalmology PGY-2  [facility]    Attending Physician Attestation: Complete documentation of historical and exam elements from today's encounter can be found in the full encounter summary report (not reduplicated in this progress note). I personally obtained the chief complaint(s) and history of present illness. I confirmed and edited as necessary the review of systems, past medical/surgical history, family history, social history, and examination findings as documented by others; and I examined the patient myself. I personally reviewed the relevant tests, images, and reports as documented above. I formulated and edited as necessary the assessment and plan and discussed the findings and management plan with the patient and family. I personally reviewed  the ophthalmic test(s) associated with this encounter, agree with the interpretation(s) as documented by the resident/fellow, and have edited the corresponding report(s) as necessary. Ruperto Cardenas MD    DIAGNOSIS: Epiphora left eye with dacryocystitis  PROCEDURE: Nasal Endoscopy and removal of Large Diameter StenTube  SURGEON: Ruperto Cardenas MD  ANESTHESIA: Topical  COMPLICATIONS: None  EBL: Nil  FINDINGS: Nicely mucosalized patent ostium from lacrimal sac to anterior middle meatus     The zero degree nasal endoscope was passed under endoscopic vision. The inferior turbinates appeared normal. There was  normal middle turbinate architecture. No polyps or purulence. No septal perforation. No masses or lesions were identified. The silicone stent was cut between the puncta, then under endoscopic guidance an alligator forceps was used to retrieve the stent from the middle meatus. The ostium on the left was nicely mucosalized and patent.     Ruperto Cardenas MD  May 26, 2020  12:46 PM  I was present for the entire procedure - Ruperto Cardenas MD

## 2020-05-26 NOTE — NURSING NOTE
Chief Complaints and History of Present Illnesses   Patient presents with     Post Op (Ophthalmology) Left Eye     Stent removal S/p Left dacryocystorhinostomy with silicone intubation. Left lacrimal sac biopsy on 2/13/2020.     Chief Complaint(s) and History of Present Illness(es)     Post Op (Ophthalmology) Left Eye     Laterality: both eyes    Onset: months ago    Frequency: constantly    Course: stable    Pain scale: 0/10    Comments: Stent removal S/p Left dacryocystorhinostomy with silicone intubation. Left lacrimal sac biopsy on 2/13/2020.              Comments     No tearing, eye pain or vision changes. No eye drops currently.     Ivy Oh COT 12:22 PM May 26, 2020

## 2020-05-26 NOTE — LETTER
2020         RE:  :  MRN: Susana Bennett  1940  [id number]     Dear Dr. Ortiz,    Our mutual patient, Susana Bennett, returned for oculoplastic follow up. My assessment and plan are below.    Chief Complaint(s) and History of Present Illness(es)     Post Op (Ophthalmology) Left Eye     Laterality: both eyes    Onset: months ago    Frequency: constantly    Course: stable    Pain scale: 0/10    Comments: Stent removal S/p Left dacryocystorhinostomy with silicone   intubation. Left lacrimal sac biopsy on 2020.              Comments     No tearing, eye pain or vision changes. No eye drops currently.     Ivy Reichanca COT 12:22 PM May 26, 2020     Doing well. She disussed path with her oncologist Dr. Shea, and there is no change to therapy.      FINAL DIAGNOSIS:   Lacrimal sac, left, biopsy:   - Atypical lymphocytic infiltrate (see comment)     COMMENT:   There is no definitive evidence for lymphoma.  Assessment & Plan     Susana Bennett is a 79 year old female with the following diagnoses:   1. Acute dacryocystitis of left lacrimal sac    2. CLL (chronic lymphocytic leukemia) (H)    3. Acquired obstruction of left nasolacrimal duct      Healing nicely, no tearing. Resolved dacryocystitis. Stent was removed in clinic and there is a nice ostium on nasal endoscopy.     F/u with Dr. Ortiz. F/u with me as needed.         Again, thank you for allowing me to participate in the care of your patient.      Sincerely,    Ruperto Cardenas MD  Department of Ophthalmology and Visual Neurosciences  [facility]      CC: Richardson Ortiz MD  [facility]           [address] [address]  VIA Facsimile: [phone]

## 2021-01-04 ENCOUNTER — HEALTH MAINTENANCE LETTER (OUTPATIENT)
Age: 81
End: 2021-01-04

## 2021-04-25 ENCOUNTER — HEALTH MAINTENANCE LETTER (OUTPATIENT)
Age: 81
End: 2021-04-25

## 2021-10-10 ENCOUNTER — HEALTH MAINTENANCE LETTER (OUTPATIENT)
Age: 81
End: 2021-10-10

## 2022-05-22 ENCOUNTER — HEALTH MAINTENANCE LETTER (OUTPATIENT)
Age: 82
End: 2022-05-22

## 2022-09-18 ENCOUNTER — HEALTH MAINTENANCE LETTER (OUTPATIENT)
Age: 82
End: 2022-09-18

## 2023-06-04 ENCOUNTER — HEALTH MAINTENANCE LETTER (OUTPATIENT)
Age: 83
End: 2023-06-04

## (undated) DEVICE — ESU NDL COLORADO MICRO 3CM STR N103A

## (undated) DEVICE — TIP NAVIGATOR SPETZLER MICRO CLAW UNIV 25KHZ 5450-800-315

## (undated) DEVICE — EYE KNIFE CRESCENT 55DEG 373807

## (undated) DEVICE — NDL 30GA 0.5" 305106

## (undated) DEVICE — TUBING SUCTION 12"X1/4" N612

## (undated) DEVICE — EYE PREP BETADINE 5% SOLUTION 30ML 0065-0411-30

## (undated) DEVICE — NDL 25GA 2"  8881200441

## (undated) DEVICE — SPONGE COTTONOID 1/2X3" 80-1407

## (undated) DEVICE — GLOVE PROTEXIS MICRO 7.5  2D73PM75

## (undated) DEVICE — SUCTION MANIFOLD NEPTUNE 2 SYS 1 PORT 702-025-000

## (undated) DEVICE — LINEN TOWEL PACK X5 5464

## (undated) DEVICE — SOL WATER IRRIG 1000ML BOTTLE 2F7114

## (undated) DEVICE — BLADE KNIFE SURG 15 371115

## (undated) DEVICE — SYR 03ML LL W/O NDL 309657

## (undated) DEVICE — ESU GROUND PAD ADULT W/CORD E7507

## (undated) DEVICE — PACK MINOR EYE CUSTOM ASC

## (undated) DEVICE — PEN MARKING SKIN TYCO DEVON DUAL TIP 31145868

## (undated) RX ORDER — GLYCOPYRROLATE 0.2 MG/ML
INJECTION INTRAMUSCULAR; INTRAVENOUS
Status: DISPENSED
Start: 2020-02-13

## (undated) RX ORDER — ACETAMINOPHEN 325 MG/1
TABLET ORAL
Status: DISPENSED
Start: 2020-02-13

## (undated) RX ORDER — FENTANYL CITRATE 50 UG/ML
INJECTION, SOLUTION INTRAMUSCULAR; INTRAVENOUS
Status: DISPENSED
Start: 2020-02-13

## (undated) RX ORDER — PROPOFOL 10 MG/ML
INJECTION, EMULSION INTRAVENOUS
Status: DISPENSED
Start: 2020-02-13

## (undated) RX ORDER — OXYCODONE HYDROCHLORIDE 5 MG/1
TABLET ORAL
Status: DISPENSED
Start: 2020-02-13

## (undated) RX ORDER — LIDOCAINE HYDROCHLORIDE 20 MG/ML
INJECTION, SOLUTION EPIDURAL; INFILTRATION; INTRACAUDAL; PERINEURAL
Status: DISPENSED
Start: 2020-02-13

## (undated) RX ORDER — ONDANSETRON 2 MG/ML
INJECTION INTRAMUSCULAR; INTRAVENOUS
Status: DISPENSED
Start: 2020-02-13

## (undated) RX ORDER — OXYMETAZOLINE HYDROCHLORIDE 0.05 G/100ML
SPRAY NASAL
Status: DISPENSED
Start: 2020-02-13